# Patient Record
Sex: FEMALE | Race: OTHER | HISPANIC OR LATINO | URBAN - METROPOLITAN AREA
[De-identification: names, ages, dates, MRNs, and addresses within clinical notes are randomized per-mention and may not be internally consistent; named-entity substitution may affect disease eponyms.]

---

## 2019-08-27 ENCOUNTER — OUTPATIENT (OUTPATIENT)
Dept: OUTPATIENT SERVICES | Facility: HOSPITAL | Age: 64
LOS: 1 days | End: 2019-08-27
Payer: COMMERCIAL

## 2019-08-27 VITALS
RESPIRATION RATE: 20 BRPM | SYSTOLIC BLOOD PRESSURE: 114 MMHG | WEIGHT: 135.14 LBS | HEART RATE: 75 BPM | HEIGHT: 61 IN | DIASTOLIC BLOOD PRESSURE: 68 MMHG | TEMPERATURE: 98 F

## 2019-08-27 DIAGNOSIS — Z98.890 OTHER SPECIFIED POSTPROCEDURAL STATES: Chronic | ICD-10-CM

## 2019-08-27 DIAGNOSIS — Z29.9 ENCOUNTER FOR PROPHYLACTIC MEASURES, UNSPECIFIED: ICD-10-CM

## 2019-08-27 DIAGNOSIS — Z13.89 ENCOUNTER FOR SCREENING FOR OTHER DISORDER: ICD-10-CM

## 2019-08-27 DIAGNOSIS — Z01.818 ENCOUNTER FOR OTHER PREPROCEDURAL EXAMINATION: ICD-10-CM

## 2019-08-27 DIAGNOSIS — K62.3 RECTAL PROLAPSE: Chronic | ICD-10-CM

## 2019-08-27 DIAGNOSIS — K62.5 HEMORRHAGE OF ANUS AND RECTUM: ICD-10-CM

## 2019-08-27 DIAGNOSIS — Z87.81 PERSONAL HISTORY OF (HEALED) TRAUMATIC FRACTURE: Chronic | ICD-10-CM

## 2019-08-27 DIAGNOSIS — Z98.82 BREAST IMPLANT STATUS: Chronic | ICD-10-CM

## 2019-08-27 LAB
ANION GAP SERPL CALC-SCNC: 11 MMOL/L — SIGNIFICANT CHANGE UP (ref 5–17)
BUN SERPL-MCNC: 16 MG/DL — SIGNIFICANT CHANGE UP (ref 8–20)
CALCIUM SERPL-MCNC: 9.1 MG/DL — SIGNIFICANT CHANGE UP (ref 8.6–10.2)
CHLORIDE SERPL-SCNC: 104 MMOL/L — SIGNIFICANT CHANGE UP (ref 98–107)
CO2 SERPL-SCNC: 27 MMOL/L — SIGNIFICANT CHANGE UP (ref 22–29)
CREAT SERPL-MCNC: 0.58 MG/DL — SIGNIFICANT CHANGE UP (ref 0.5–1.3)
GLUCOSE SERPL-MCNC: 100 MG/DL — SIGNIFICANT CHANGE UP (ref 70–115)
HCT VFR BLD CALC: 42.8 % — SIGNIFICANT CHANGE UP (ref 34.5–45)
HGB BLD-MCNC: 13.6 G/DL — SIGNIFICANT CHANGE UP (ref 11.5–15.5)
MCHC RBC-ENTMCNC: 30.6 PG — SIGNIFICANT CHANGE UP (ref 27–34)
MCHC RBC-ENTMCNC: 31.8 GM/DL — LOW (ref 32–36)
MCV RBC AUTO: 96.2 FL — SIGNIFICANT CHANGE UP (ref 80–100)
PLATELET # BLD AUTO: 348 K/UL — SIGNIFICANT CHANGE UP (ref 150–400)
POTASSIUM SERPL-MCNC: 4.8 MMOL/L — SIGNIFICANT CHANGE UP (ref 3.5–5.3)
POTASSIUM SERPL-SCNC: 4.8 MMOL/L — SIGNIFICANT CHANGE UP (ref 3.5–5.3)
RBC # BLD: 4.45 M/UL — SIGNIFICANT CHANGE UP (ref 3.8–5.2)
RBC # FLD: 12.9 % — SIGNIFICANT CHANGE UP (ref 10.3–14.5)
SODIUM SERPL-SCNC: 142 MMOL/L — SIGNIFICANT CHANGE UP (ref 135–145)
WBC # BLD: 4.87 K/UL — SIGNIFICANT CHANGE UP (ref 3.8–10.5)
WBC # FLD AUTO: 4.87 K/UL — SIGNIFICANT CHANGE UP (ref 3.8–10.5)

## 2019-08-27 PROCEDURE — 93010 ELECTROCARDIOGRAM REPORT: CPT

## 2019-08-27 PROCEDURE — 93005 ELECTROCARDIOGRAM TRACING: CPT

## 2019-08-27 PROCEDURE — 80048 BASIC METABOLIC PNL TOTAL CA: CPT

## 2019-08-27 PROCEDURE — 85027 COMPLETE CBC AUTOMATED: CPT

## 2019-08-27 PROCEDURE — G0463: CPT

## 2019-08-27 PROCEDURE — 36415 COLL VENOUS BLD VENIPUNCTURE: CPT

## 2019-08-27 RX ORDER — ASPIRIN/CALCIUM CARB/MAGNESIUM 324 MG
1 TABLET ORAL
Qty: 0 | Refills: 0 | DISCHARGE

## 2019-08-27 RX ORDER — ALENDRONATE SODIUM 70 MG/1
70 TABLET ORAL
Qty: 0 | Refills: 0 | DISCHARGE

## 2019-08-27 NOTE — H&P PST ADULT - NSICDXPROBLEM_GEN_ALL_CORE_FT
PROBLEM DIAGNOSES  Problem: Hemorrhage of anus and rectum  Assessment and Plan: Colonoscopy  Medical clearance    Problem: Need for prophylactic measure  Assessment and Plan:     Problem: Screening for substance abuse  Assessment and Plan: PROBLEM DIAGNOSES  Problem: Hemorrhage of anus and rectum  Assessment and Plan: Colonoscopy  Medical clearance    Problem: Need for prophylactic measure  Assessment and Plan: Moderate risk.  Surgical team to evaluate need for pharmacologic VTE Prophylaxis    Problem: Screening for substance abuse  Assessment and Plan: Opioid screening tool score =0.  Low risk for potential abuse

## 2019-08-27 NOTE — H&P PST ADULT - NSANTHOSAYNRD_GEN_A_CORE
No. INDERJIT screening performed.  STOP BANG Legend: 0-2 = LOW Risk; 3-4 = INTERMEDIATE Risk; 5-8 = HIGH Risk

## 2019-08-27 NOTE — H&P PST ADULT - SKIN COMMENTS
Pt has reddened circular (approximately dime size) area to LLE with slightly darkened center. No drainage; possible mosquito bite

## 2019-08-27 NOTE — H&P PST ADULT - NSICDXPASTSURGICALHX_GEN_ALL_CORE_FT
PAST SURGICAL HISTORY:  H/O breast implant     S/P abdominoplasty PAST SURGICAL HISTORY:  H/O breast implant     History of laryngoscopy Removal of chicken bone    Rectal prolapse     S/P abdominoplasty     Status post fracture of femur

## 2019-08-27 NOTE — H&P PST ADULT - ASSESSMENT

## 2019-08-27 NOTE — H&P PST ADULT - NSICDXFAMILYHX_GEN_ALL_CORE_FT
FAMILY HISTORY:  Mother  Still living? Yes, Estimated age: 81-90  FH: hypertension, Age at diagnosis: Age Unknown    Sibling  Still living? No  FHx: ovarian cancer, Age at diagnosis: 51-60

## 2019-08-27 NOTE — H&P PST ADULT - HISTORY OF PRESENT ILLNESS
This is a 64 y.o female who presents to PST today. This is a 64 y.o female who presents to PST today.  The pt is to undergo  Colonoscopy screening as she reports "a little blood" when she wipes.  She reports she will be seeking a surgical evaluation from a Colorectal surgeon  after the colonoscopy in view of rectal prolapse.

## 2019-09-03 ENCOUNTER — OUTPATIENT (OUTPATIENT)
Dept: OUTPATIENT SERVICES | Facility: HOSPITAL | Age: 64
LOS: 1 days | End: 2019-09-03
Payer: COMMERCIAL

## 2019-09-03 DIAGNOSIS — K62.5 HEMORRHAGE OF ANUS AND RECTUM: ICD-10-CM

## 2019-09-03 DIAGNOSIS — Z98.82 BREAST IMPLANT STATUS: Chronic | ICD-10-CM

## 2019-09-03 DIAGNOSIS — Z98.890 OTHER SPECIFIED POSTPROCEDURAL STATES: Chronic | ICD-10-CM

## 2019-09-03 DIAGNOSIS — Z87.81 PERSONAL HISTORY OF (HEALED) TRAUMATIC FRACTURE: Chronic | ICD-10-CM

## 2019-09-03 DIAGNOSIS — K62.3 RECTAL PROLAPSE: Chronic | ICD-10-CM

## 2019-09-03 PROCEDURE — 45378 DIAGNOSTIC COLONOSCOPY: CPT

## 2019-10-27 ENCOUNTER — EMERGENCY (EMERGENCY)
Facility: HOSPITAL | Age: 64
LOS: 1 days | Discharge: HOME | End: 2019-10-27
Attending: STUDENT IN AN ORGANIZED HEALTH CARE EDUCATION/TRAINING PROGRAM
Payer: COMMERCIAL

## 2019-10-27 VITALS
HEIGHT: 61 IN | SYSTOLIC BLOOD PRESSURE: 148 MMHG | WEIGHT: 130.07 LBS | RESPIRATION RATE: 18 BRPM | HEART RATE: 75 BPM | OXYGEN SATURATION: 98 % | DIASTOLIC BLOOD PRESSURE: 85 MMHG | TEMPERATURE: 98 F

## 2019-10-27 DIAGNOSIS — Z87.81 PERSONAL HISTORY OF (HEALED) TRAUMATIC FRACTURE: Chronic | ICD-10-CM

## 2019-10-27 DIAGNOSIS — Z98.890 OTHER SPECIFIED POSTPROCEDURAL STATES: Chronic | ICD-10-CM

## 2019-10-27 DIAGNOSIS — K62.3 RECTAL PROLAPSE: Chronic | ICD-10-CM

## 2019-10-27 DIAGNOSIS — Z98.82 BREAST IMPLANT STATUS: Chronic | ICD-10-CM

## 2019-10-27 PROCEDURE — 99284 EMERGENCY DEPT VISIT MOD MDM: CPT

## 2019-10-27 NOTE — ED ADULT TRIAGE NOTE - CHIEF COMPLAINT QUOTE
Pt. complaining of ongoing allergic reaction.  Pt. states 3 weeks ago she woke up after "so many days in a row of eating peanut butter" and noticed her left side of her face to be swollen and she felt her throat was tight; Pt. called MD and went 2 weeks ago got unknown shot and xyzal 5mg to take daily and took blood work.  Pt. states she does not feel as if her swelling in her face is reducing. Pt. with no known history of allergy to peanut butter.  Pt. stable in triage.

## 2019-10-28 PROBLEM — M81.0 AGE-RELATED OSTEOPOROSIS WITHOUT CURRENT PATHOLOGICAL FRACTURE: Chronic | Status: ACTIVE | Noted: 2019-08-27

## 2019-10-28 PROBLEM — D64.9 ANEMIA, UNSPECIFIED: Chronic | Status: ACTIVE | Noted: 2019-08-27

## 2019-10-28 PROCEDURE — 70450 CT HEAD/BRAIN W/O DYE: CPT

## 2019-10-28 PROCEDURE — 70450 CT HEAD/BRAIN W/O DYE: CPT | Mod: 26

## 2019-10-28 PROCEDURE — T1013: CPT

## 2019-10-28 PROCEDURE — 99284 EMERGENCY DEPT VISIT MOD MDM: CPT

## 2019-10-28 RX ORDER — AZITHROMYCIN 500 MG/1
1 TABLET, FILM COATED ORAL
Qty: 1 | Refills: 0
Start: 2019-10-28 | End: 2019-11-01

## 2019-10-28 NOTE — ED PROVIDER NOTE - PSH
H/O breast implant    History of laryngoscopy  Removal of chicken bone  Rectal prolapse    S/P abdominoplasty    Status post fracture of femur

## 2019-10-28 NOTE — ED PROVIDER NOTE - PATIENT PORTAL LINK FT
You can access the FollowMyHealth Patient Portal offered by [facility] by registering at the following website: [email or web address]. By joining [facility]’s FollowMyHealth portal, you will also be able to view your health information using other applications (apps) compatible with our system.

## 2019-10-28 NOTE — ED PROVIDER NOTE - CLINICAL SUMMARY MEDICAL DECISION MAKING FREE TEXT BOX
PT with stable VS, no acute distress, non toxic appearing, tolerating PO in the ED, PT isa intact with mild symptoms likley related to her sinusitis, will tx dc home with follow up to PCP, educated about when to return to the ED if needed. PT verbalizes that he understands all instructions and results.

## 2019-10-28 NOTE — ED PROVIDER NOTE - ATTENDING CONTRIBUTION TO CARE
63 yo female presenting for evaluation of worsening facial discomfort/swelling recurrent for the past  2-3 weeks. I personally saw the patient with the PA, and completed the key components of the history and physical exam. I then discussed the management plan with the PA.

## 2019-10-28 NOTE — ED PROVIDER NOTE - OBJECTIVE STATEMENT
65 y/o F pt presents to the ED c/o mild HA and facial edema, L worse than R, that began 2 weeks ago. Pt reports that 2 weeks ago (10/14/19), she ate some peanut butter, her throat felt tight, noted that her face swelled, saw her MD, tested for peanut allergies, had blood drawn, received an injection and Rx Levocetirizine 5mg, and was told to report to MD 10/23/19 for her results, but MD was on vacation and does not yet know results. However, reports that symptoms have not resolved and that she now has a HA, L arm numbness/warmth, L face tingling/edema, L eye tearing, L ear pain. 65 y/o F pt presents to the ED c/o mild HA and facial edema, L worse than R, that began 3 weeks ago. Pt reports that 3weeks ago  she ate some peanut butter, her throat felt tight, noted that her face swelled, saw her MD, tested for peanut allergies, had blood drawn, received an injection and Rx Levocetirizine 5mg, and was told to report to MD 10/23/19 for her results, but MD was on vacation and does not yet know results. However, reports that symptoms have not resolved and that she now has a HA, L facial tingling warmth, swelling, L eye tearing, L ear pain.
